# Patient Record
(demographics unavailable — no encounter records)

---

## 2024-11-18 NOTE — DISCUSSION/SUMMARY
[FreeTextEntry1] : ASTHMA MILD INTERMITTENT AS MEEDED INHALERS PFT NOTED CXR NOTED RENEW ALBUTEROL FUP AS NEEDED